# Patient Record
Sex: FEMALE | Race: WHITE | Employment: FULL TIME | URBAN - METROPOLITAN AREA
[De-identification: names, ages, dates, MRNs, and addresses within clinical notes are randomized per-mention and may not be internally consistent; named-entity substitution may affect disease eponyms.]

---

## 2020-05-04 ENCOUNTER — HOSPITAL ENCOUNTER (EMERGENCY)
Facility: CLINIC | Age: 55
Discharge: HOME | End: 2020-05-04
Attending: FAMILY MEDICINE | Admitting: FAMILY MEDICINE
Payer: COMMERCIAL

## 2020-05-04 VITALS
WEIGHT: 242 LBS | TEMPERATURE: 98.1 F | RESPIRATION RATE: 16 BRPM | DIASTOLIC BLOOD PRESSURE: 107 MMHG | OXYGEN SATURATION: 97 % | SYSTOLIC BLOOD PRESSURE: 175 MMHG

## 2020-05-04 DIAGNOSIS — M62.830 SPASM OF LUMBAR PARASPINOUS MUSCLE: ICD-10-CM

## 2020-05-04 LAB
ALBUMIN UR-MCNC: 30 MG/DL
APPEARANCE UR: ABNORMAL
BILIRUB UR QL STRIP: NEGATIVE
COLOR UR AUTO: YELLOW
GLUCOSE UR STRIP-MCNC: NEGATIVE MG/DL
HGB UR QL STRIP: NEGATIVE
KETONES UR STRIP-MCNC: NEGATIVE MG/DL
LEUKOCYTE ESTERASE UR QL STRIP: ABNORMAL
MUCOUS THREADS #/AREA URNS LPF: PRESENT /LPF
NITRATE UR QL: NEGATIVE
PH UR STRIP: 5 PH (ref 5–7)
RBC #/AREA URNS AUTO: 1 /HPF (ref 0–2)
SOURCE: ABNORMAL
SP GR UR STRIP: 1.03 (ref 1–1.03)
SQUAMOUS #/AREA URNS AUTO: 15 /HPF (ref 0–1)
UROBILINOGEN UR STRIP-MCNC: 2 MG/DL (ref 0–2)
WBC #/AREA URNS AUTO: 20 /HPF (ref 0–5)

## 2020-05-04 PROCEDURE — 99284 EMERGENCY DEPT VISIT MOD MDM: CPT | Mod: Z6 | Performed by: FAMILY MEDICINE

## 2020-05-04 PROCEDURE — 25000132 ZZH RX MED GY IP 250 OP 250 PS 637: Performed by: FAMILY MEDICINE

## 2020-05-04 PROCEDURE — 87086 URINE CULTURE/COLONY COUNT: CPT | Performed by: FAMILY MEDICINE

## 2020-05-04 PROCEDURE — 81001 URINALYSIS AUTO W/SCOPE: CPT | Performed by: EMERGENCY MEDICINE

## 2020-05-04 PROCEDURE — 99283 EMERGENCY DEPT VISIT LOW MDM: CPT | Performed by: FAMILY MEDICINE

## 2020-05-04 RX ORDER — CYCLOBENZAPRINE HCL 5 MG
5 TABLET ORAL 3 TIMES DAILY PRN
Qty: 15 TABLET | Refills: 0 | Status: SHIPPED | OUTPATIENT
Start: 2020-05-04 | End: 2020-05-10

## 2020-05-04 RX ORDER — OXYCODONE HYDROCHLORIDE 5 MG/1
5 TABLET ORAL ONCE
Status: COMPLETED | OUTPATIENT
Start: 2020-05-04 | End: 2020-05-04

## 2020-05-04 RX ORDER — IBUPROFEN 600 MG/1
600 TABLET, FILM COATED ORAL ONCE
Status: COMPLETED | OUTPATIENT
Start: 2020-05-04 | End: 2020-05-04

## 2020-05-04 RX ORDER — LIDOCAINE 4 G/G
1 PATCH TOPICAL ONCE
Status: DISCONTINUED | OUTPATIENT
Start: 2020-05-04 | End: 2020-05-05 | Stop reason: HOSPADM

## 2020-05-04 RX ORDER — LORAZEPAM 1 MG/1
1 TABLET ORAL ONCE
Status: COMPLETED | OUTPATIENT
Start: 2020-05-04 | End: 2020-05-04

## 2020-05-04 RX ORDER — IBUPROFEN 200 MG
600 TABLET ORAL EVERY 6 HOURS PRN
Refills: 0 | COMMUNITY
Start: 2020-05-04 | End: 2020-05-07

## 2020-05-04 RX ORDER — OXYCODONE HYDROCHLORIDE 5 MG/1
5 TABLET ORAL EVERY 6 HOURS PRN
Qty: 10 TABLET | Refills: 0 | Status: SHIPPED | OUTPATIENT
Start: 2020-05-04 | End: 2020-05-07

## 2020-05-04 RX ORDER — LIDOCAINE 4 G/G
1 PATCH TOPICAL EVERY 12 HOURS PRN
Refills: 0 | COMMUNITY
Start: 2020-05-04

## 2020-05-04 RX ADMIN — IBUPROFEN 600 MG: 600 TABLET ORAL at 21:50

## 2020-05-04 RX ADMIN — LIDOCAINE 1 PATCH: 560 PATCH PERCUTANEOUS; TOPICAL; TRANSDERMAL at 21:50

## 2020-05-04 RX ADMIN — LORAZEPAM 1 MG: 1 TABLET ORAL at 21:51

## 2020-05-04 RX ADMIN — OXYCODONE HYDROCHLORIDE 5 MG: 5 TABLET ORAL at 21:50

## 2020-05-04 ASSESSMENT — ENCOUNTER SYMPTOMS
CARDIOVASCULAR NEGATIVE: 1
DIFFICULTY URINATING: 0
EYES NEGATIVE: 1
ACTIVITY CHANGE: 1
PSYCHIATRIC NEGATIVE: 1
FLANK PAIN: 1
NEUROLOGICAL NEGATIVE: 1
DYSURIA: 0
RESPIRATORY NEGATIVE: 1
ENDOCRINE NEGATIVE: 1
GASTROINTESTINAL NEGATIVE: 1
FEVER: 0
BACK PAIN: 1
HEMATURIA: 0

## 2020-05-04 NOTE — ED AVS SNAPSHOT
[facility] Emergency Department  [address]   [place] [address]  Phone:  [phone]  Fax:  [phone]                                    Rita Roche   MRN: [id number]    Department:  [facility] Emergency Department   Date of Visit:  5/4/2020           After Visit Summary Signature Page    I have received my discharge instructions, and my questions have been answered. I have discussed any challenges I see with this plan with the nurse or doctor.    ..........................................................................................................................................  Patient/Patient Representative Signature      ..........................................................................................................................................  Patient Representative Print Name and Relationship to Patient    ..................................................               ................................................  Date                                   Time    ..........................................................................................................................................  Reviewed by Signature/Title    ...................................................              ..............................................  Date                                               Time          22EPIC Rev 08/18

## 2020-05-05 NOTE — ED PROVIDER NOTES
History     Chief Complaint   Patient presents with     Flank Pain     HPI  Rita Roche is a 54 year old female who presented to the emergency room with complaints of left-sided flank area pain.  She is concerned about the possibility of a kidney stone.  She denies any history of kidney stone but states that her  has had stones and he thinks that her symptoms are similar.  Patient states that her symptoms started yesterday after doing quite a bit of lifting cleaning her yard in a house for last several days.  She stated that she was sitting on a bench chair washing her dog yesterday in a bent position and following that it seemed like the pain started and is gotten worse since.  Today her pains increased to the point where she is having difficult time tolerating.  She did try some Advil at about 3:00 this afternoon with some Tylenol taken about 6:00 this evening but with little improvement in her pain.  She denies any pain with urination or burning with urination.  She denies any blood in the urine.  She denies any specific fall or injury.  She denies any incontinence of bowel or bladder.  She denies any numbness or tingling into her legs.  She denies any muscle weakness into her legs.  She denies any fever.    Allergies:  Allergies   Allergen Reactions     Penicillin [Penicillins] Rash       Problem List:    There are no active problems to display for this patient.       Past Medical History:    History reviewed. No pertinent past medical history.    Past Surgical History:    Past Surgical History:   Procedure Laterality Date     TONSILLECTOMY & ADENOIDECTOMY         Family History:    History reviewed. No pertinent family history.    Social History:  Marital Status:   [2]  Social History     Tobacco Use     Smoking status: Never Smoker     Smokeless tobacco: Never Used   Substance Use Topics     Alcohol use: Yes     Comment: occ     Drug use: Never        Medications:    cyclobenzaprine  (FLEXERIL) 5 MG tablet  ibuprofen (ADVIL/MOTRIN) 200 MG tablet  Lidocaine (LIDOCARE) 4 % Patch  oxyCODONE (ROXICODONE) 5 MG tablet  AZITHROMYCIN 250 MG OR TABS      Review of Systems   Constitutional: Positive for activity change. Negative for fever.   HENT: Negative.    Eyes: Negative.    Respiratory: Negative.    Cardiovascular: Negative.    Gastrointestinal: Negative.    Endocrine: Negative.    Genitourinary: Positive for flank pain (left). Negative for difficulty urinating, dysuria, hematuria, pelvic pain, vaginal bleeding, vaginal discharge and vaginal pain.   Musculoskeletal: Positive for back pain.   Skin: Negative.  Negative for rash.   Neurological: Negative.    Psychiatric/Behavioral: Negative.    All other systems reviewed and are negative.      Physical Exam   BP: (!) 175/107  Heart Rate: 88  Temp: 98.1  F (36.7  C)  Resp: 16  Weight: 109.8 kg (242 lb)  SpO2: 97 %      Physical Exam  Vitals signs and nursing note reviewed.   Constitutional:       General: She is in acute distress (Back pain\).      Appearance: She is obese.   HENT:      Head: Normocephalic and atraumatic.      Mouth/Throat:      Mouth: Mucous membranes are moist.   Eyes:      Extraocular Movements: Extraocular movements intact.      Conjunctiva/sclera: Conjunctivae normal.      Pupils: Pupils are equal, round, and reactive to light.   Neck:      Musculoskeletal: Normal range of motion and neck supple.   Cardiovascular:      Rate and Rhythm: Normal rate.   Pulmonary:      Effort: Pulmonary effort is normal. No respiratory distress.   Musculoskeletal:         General: Tenderness present.      Lumbar back: She exhibits decreased range of motion, tenderness, pain and spasm. She exhibits no bony tenderness, no swelling, no edema, no deformity, no laceration and normal pulse.        Back:    Skin:     Capillary Refill: Capillary refill takes less than 2 seconds.      Findings: No lesion or rash.   Neurological:      Mental Status: She is alert  and oriented to person, place, and time.      Sensory: No sensory deficit.      Coordination: Coordination normal.      Deep Tendon Reflexes: Reflexes normal.   Psychiatric:         Mood and Affect: Mood normal.         Behavior: Behavior normal.         ED Course     ED Course as of May 04 2243   Mon May 04, 2020   2224 Patient reexamined and states that she is feeling improved.  She was also able to give us a urine sample so this will be sent down for analysis.  We will continue to monitor.        Procedures               Critical Care time:  none               Results for orders placed or performed during the hospital encounter of 05/04/20 (from the past 24 hour(s))   Routine UA with microscopic   Result Value Ref Range    Color Urine Yellow     Appearance Urine Slightly Cloudy     Glucose Urine Negative NEG^Negative mg/dL    Bilirubin Urine Negative NEG^Negative    Ketones Urine Negative NEG^Negative mg/dL    Specific Gravity Urine 1.030 1.003 - 1.035    Blood Urine Negative NEG^Negative    pH Urine 5.0 5.0 - 7.0 pH    Protein Albumin Urine 30 (A) NEG^Negative mg/dL    Urobilinogen mg/dL 2.0 0.0 - 2.0 mg/dL    Nitrite Urine Negative NEG^Negative    Leukocyte Esterase Urine Small (A) NEG^Negative    Source Midstream Urine     WBC Urine 20 (H) 0 - 5 /HPF    RBC Urine 1 0 - 2 /HPF    Squamous Epithelial /HPF Urine 15 (H) 0 - 1 /HPF    Mucous Urine Present (A) NEG^Negative /LPF       Medications   Lidocaine (LIDOCARE) 4 % Patch 1 patch (1 patch Transdermal Patch/Med Applied 5/4/20 2150)   oxyCODONE (ROXICODONE) tablet 5 mg (5 mg Oral Given 5/4/20 2150)   ibuprofen (ADVIL/MOTRIN) tablet 600 mg (600 mg Oral Given 5/4/20 2150)   LORazepam (ATIVAN) tablet 1 mg (1 mg Oral Given 5/4/20 2151)       Assessments & Plan (with Medical Decision Making)  54-year-old female to the ER secondary concerns of possible kidney stone with the onset of left sided back pain yesterday morning and being more severe this afternoon.  Patient  with exam findings most consistent with acute paralumbar spasm with tenderness with palpation and with range of motion to the area of the left low back.  Patient without evidence for significant hematuria and has had no dysuria, fever, or nausea or vomiting symptoms.  Patient without findings of incontinence of bowel or bladder, muscle weakness to the lower extremities.  Patient was treated with medications as listed above.  She had significant improvement in her pain symptoms.  I did send the urine for culture and told the patient that if abnormal she will be contacted in 2 to 3 days.  She is encouraged to return to the ER should she develop fever, pain with urination, blood in the urine, or nausea or vomiting symptoms.  Medication prescribed as listed below.  She was instructed the use of ice therapy if not using the lidocaine patches followed by gentle stretching or massage to the area.  She is encouraged to continue to be active with swimming or walking daily.     I have reviewed the nursing notes.    I have reviewed the findings, diagnosis, plan and need for follow up with the patient.       New Prescriptions    CYCLOBENZAPRINE (FLEXERIL) 5 MG TABLET    Take 1 tablet (5 mg) by mouth 3 times daily as needed for muscle spasms    IBUPROFEN (ADVIL/MOTRIN) 200 MG TABLET    Take 3 tablets (600 mg) by mouth every 6 hours as needed for pain or fever (TAKE WITH FOOD) TAKE WITH FOOD AS NEEDED FOR PAIN    LIDOCAINE (LIDOCARE) 4 % PATCH    Place 1 patch onto the skin every 12 hours as needed for moderate pain (apply the patch over the area of pain) Salon Pas is the brand name of the patch and can be purchased without a prescription.    OXYCODONE (ROXICODONE) 5 MG TABLET    Take 1 tablet (5 mg) by mouth every 6 hours as needed for pain            I verbally discussed the findings of the evaluation today in the ER. I have verbally discussed with Rita the suggested treatment(s) as described in the discharge instructions and  handouts. I have prescribed the above listed medications and instructed her on appropriate use of these medications.      I have verbally suggested she follow-up in her clinic or return to the ER for increased symptoms. See the follow-up recommendations documented  in the after visit summary in this visit's EPIC chart.    Final diagnoses:   Spasm of lumbar paraspinous muscle       5/4/2020   [facility] EMERGENCY DEPARTMENT     Antony Forrest,   05/04/20 0296

## 2020-05-05 NOTE — ED NOTES
Pt c/o left midback/flank pain x yesterday afternoon.  She has been having spasms off/on all day and has got worse today.  She was moving boxes and gave her big dog a bath yesterday.  Never has had anything like this b/4.  No urinary sx.  She has tried ice/heat/tylenol/advil all say.

## 2020-05-05 NOTE — DISCHARGE INSTRUCTIONS
Please read and follow the handout(s) instructions. Return, if needed, for increased or worsening symptoms and as directed by the handout(s).    Patient was notified that we did send the urine for culture and that if she develops symptoms of fever, chills, pain with urination, blood in the urine, or nausea or vomiting to return to the ER for recheck.  We will contact her in 2 to 3 days with the culture results should they be abnormal.

## 2020-05-05 NOTE — ED TRIAGE NOTES
Pt comes in with complaints of L flank pain that started yesterday. Pt denies hx of kidney stone.

## 2020-05-06 LAB
BACTERIA SPEC CULT: NORMAL
Lab: NORMAL
SPECIMEN SOURCE: NORMAL

## 2020-05-06 NOTE — RESULT ENCOUNTER NOTE
Final urine culture report is NEGATIVE per [facility] ED Lab Result protocol.    If NEGATIVE result, no change in treatment, per [facility] ED Lab Result protocol.